# Patient Record
Sex: MALE | Race: WHITE | NOT HISPANIC OR LATINO | Employment: UNEMPLOYED | URBAN - METROPOLITAN AREA
[De-identification: names, ages, dates, MRNs, and addresses within clinical notes are randomized per-mention and may not be internally consistent; named-entity substitution may affect disease eponyms.]

---

## 2018-02-04 ENCOUNTER — OFFICE VISIT - RIVER FALLS (OUTPATIENT)
Dept: FAMILY MEDICINE | Facility: CLINIC | Age: 28
End: 2018-02-04

## 2018-02-04 ASSESSMENT — MIFFLIN-ST. JEOR: SCORE: 1590.4

## 2018-04-05 ENCOUNTER — OFFICE VISIT - RIVER FALLS (OUTPATIENT)
Dept: FAMILY MEDICINE | Facility: CLINIC | Age: 28
End: 2018-04-05

## 2018-04-05 ASSESSMENT — MIFFLIN-ST. JEOR: SCORE: 1583.14

## 2018-04-25 ENCOUNTER — OFFICE VISIT - RIVER FALLS (OUTPATIENT)
Dept: FAMILY MEDICINE | Facility: CLINIC | Age: 28
End: 2018-04-25

## 2018-04-25 ASSESSMENT — MIFFLIN-ST. JEOR: SCORE: 1616.71

## 2018-04-26 ENCOUNTER — OFFICE VISIT - RIVER FALLS (OUTPATIENT)
Dept: FAMILY MEDICINE | Facility: CLINIC | Age: 28
End: 2018-04-26

## 2018-04-26 ASSESSMENT — MIFFLIN-ST. JEOR: SCORE: 1608.54

## 2018-05-03 ENCOUNTER — OFFICE VISIT - RIVER FALLS (OUTPATIENT)
Dept: FAMILY MEDICINE | Facility: CLINIC | Age: 28
End: 2018-05-03

## 2018-05-03 ASSESSMENT — MIFFLIN-ST. JEOR: SCORE: 1595.84

## 2018-05-23 ENCOUNTER — OFFICE VISIT - RIVER FALLS (OUTPATIENT)
Dept: FAMILY MEDICINE | Facility: CLINIC | Age: 28
End: 2018-05-23

## 2018-05-23 ASSESSMENT — MIFFLIN-ST. JEOR: SCORE: 1581.33

## 2018-08-03 ENCOUNTER — OFFICE VISIT - RIVER FALLS (OUTPATIENT)
Dept: FAMILY MEDICINE | Facility: CLINIC | Age: 28
End: 2018-08-03

## 2018-08-03 ASSESSMENT — MIFFLIN-ST. JEOR: SCORE: 1633.04

## 2018-08-04 LAB
CREAT SERPL-MCNC: 0.79 MG/DL (ref 0.6–1.35)
GLUCOSE BLD-MCNC: 78 MG/DL (ref 65–99)

## 2022-02-11 VITALS
HEIGHT: 70 IN | DIASTOLIC BLOOD PRESSURE: 82 MMHG | HEIGHT: 70 IN | SYSTOLIC BLOOD PRESSURE: 122 MMHG | WEIGHT: 141.6 LBS | TEMPERATURE: 99 F | TEMPERATURE: 98.8 F | SYSTOLIC BLOOD PRESSURE: 124 MMHG | BODY MASS INDEX: 19.87 KG/M2 | BODY MASS INDEX: 20.53 KG/M2 | SYSTOLIC BLOOD PRESSURE: 126 MMHG | HEIGHT: 70 IN | HEART RATE: 84 BPM | TEMPERATURE: 98.2 F | WEIGHT: 135.6 LBS | DIASTOLIC BLOOD PRESSURE: 80 MMHG | SYSTOLIC BLOOD PRESSURE: 138 MMHG | TEMPERATURE: 99.2 F | SYSTOLIC BLOOD PRESSURE: 124 MMHG | HEART RATE: 108 BPM | WEIGHT: 136 LBS | WEIGHT: 138.8 LBS | DIASTOLIC BLOOD PRESSURE: 94 MMHG | WEIGHT: 143.4 LBS | BODY MASS INDEX: 19.47 KG/M2 | DIASTOLIC BLOOD PRESSURE: 92 MMHG | DIASTOLIC BLOOD PRESSURE: 94 MMHG | TEMPERATURE: 99.8 F | HEART RATE: 90 BPM | BODY MASS INDEX: 19.41 KG/M2 | HEART RATE: 90 BPM | BODY MASS INDEX: 20.27 KG/M2 | HEIGHT: 70 IN | HEIGHT: 70 IN

## 2022-02-11 VITALS
HEART RATE: 97 BPM | SYSTOLIC BLOOD PRESSURE: 135 MMHG | HEIGHT: 70 IN | BODY MASS INDEX: 21.05 KG/M2 | DIASTOLIC BLOOD PRESSURE: 89 MMHG | TEMPERATURE: 97.2 F | WEIGHT: 147 LBS

## 2022-02-11 VITALS
HEART RATE: 112 BPM | SYSTOLIC BLOOD PRESSURE: 120 MMHG | HEIGHT: 70 IN | TEMPERATURE: 99.1 F | WEIGHT: 137.6 LBS | BODY MASS INDEX: 19.7 KG/M2 | OXYGEN SATURATION: 99 % | DIASTOLIC BLOOD PRESSURE: 80 MMHG

## 2022-02-16 NOTE — PROGRESS NOTES
Patient:   MORE PAREDES            MRN: [id number]            FIN: [id number]               Age:   28 years     Sex:  Male     :  1990   Associated Diagnoses:   Syncope   Author:   Bhanu Gray MD      Visit Information      Date of Service: 2018 03:30 pm  Performing Location: [facility]  Encounter#: [id number]      Primary Care Provider (PCP):  YOGESH MARTINEZ      Referring Provider:  Bhanu Gray MD    NPI# [id number]      Chief Complaint   8/3/2018 3:33 PM CDT     Seizures and vomiting 2 days. Did have 2 seizures a few years ago. Has been sleeping more then normal. Did stop his Adderall a month ago.        History of Present Illness   Patient is concerned that he may have had a seizure.  He notes he got up 2 nights ago to go to the bathroom started feeling a little woozy lightheaded and and found himself on the floor.  No known seizure activity he was not incontinent he did not bite his tongue.  He had another episode yesterday morning right after taking a shower.  Both episodes are very brief.  The night before following supper he had several emesis but no nausea vomiting before or after.  He has been feeling fine since that time he did miss work.  Feels somewhat tired and fatigued yesterday and today but otherwise no fevers chills or sweats no headaches blurred vision double vision no shortness of breath.  No stool change.  Apparently he had drug-related seizures a few years ago.  Admits to doing heavy methamphetamines a month ago or so but none since.  He also said has stopped his Adderall.         Review of Systems   Constitutional:  Negative except as documented in history of present illness.    Eye:  Negative.    Ear/Nose/Mouth/Throat:  Negative.    Respiratory:  Negative.    Cardiovascular:  Negative.    Gastrointestinal:  Negative except as documented in history of present illness.    Genitourinary:  Negative.    Musculoskeletal:  Negative.     Integumentary:  Negative.    Neurologic:  Negative except as documented in history of present illness.       Health Status   Allergies:    Allergic Reactions (Selected)  Severity Not Documented  Codeine (Troubles breathing)  Penicillins (Breathing difficulties)   Medications:    Medications          No Recorded Medications        Histories   Past Medical History:    No active or resolved past medical history items have been selected or recorded.   Family History:    No family history items have been selected or recorded.   Procedure history:    No active procedure history items have been selected or recorded.   Social History:             No active social history items have been recorded.      Physical Examination   Vital Signs   8/3/2018 3:33 PM CDT Temperature Tympanic 97.2 DegF  LOW    Peripheral Pulse Rate 97 bpm    Systolic Blood Pressure 135 mmHg  HI    Diastolic Blood Pressure 89 mmHg  HI    Mean Arterial Pressure 104 mmHg      Measurements from flowsheet : Measurements   8/3/2018 3:33 PM CDT Height Measured - Standard 70 in    Weight Measured - Standard 147 lb    BSA 1.81 m2    Body Mass Index 21.09 kg/m2      General:  Alert and oriented, No acute distress.    Eye:  Pupils are equal, round and reactive to light, Extraocular movements are intact.    HENT:  No pharyngeal erythema.    Neck:  Supple, Non-tender, No lymphadenopathy, No thyromegaly.    Respiratory:  Lungs are clear to auscultation, Respirations are non-labored.    Cardiovascular:  Normal rate, Regular rhythm, No murmur, Normal peripheral perfusion, No edema.    Gastrointestinal:  Soft, Non-tender.    Neurologic:  Alert, Oriented, Normal sensory, Normal motor function, No focal deficits, Cranial Nerves II-XII are grossly intact.       Impression and Plan   Diagnosis     Syncope ([id number]).     Course:  Improving.    Plan:  Patient with episodes that sound be a syncopal episode rather than seizure.  We discussed with him his symptoms likely  related to recent emesis with dehydration.  Will check labs today.  He denied any help when it comes to drug addiction.  Follow-up for his routine cares continue to follow blood pressure  .    Patient Instructions:       Counseled: Patient, Regarding diagnosis, Regarding treatment, Activity.

## 2022-02-16 NOTE — PROGRESS NOTES
Chief Complaint    F/u constipation  History of Present Illness      Took the bottle of magnesium citrate without benefit.        Lump and pressure is present but less today.       Stool is hard most of the time. Last bowel movement 4 days. Usually goes twice a week for years.  Review of Systems      No fevers       No vomiting       Works at [employer]  Physical Exam   Vitals & Measurements    T: 99.0(Tympanic)  HR: 84(Peripheral)  BP: 124/92     HT: 70 in  WT: 141.6 lb  BMI: 20.32       General: No acute distress      Abdomen: Soft, nontender and nondistended      Musculoskeletal: Normal gait.  Sitting normally and comfortably today  Assessment/Plan   Constipation: He is concerned and would like to have a bowel movement.  Given colonoscopy prep information with Dulcolax and MiraLAX.  Will do that tonight and then take MiraLAX twice daily and follow-up in a week.  Patient Information     Name:MORE PAREDES      Address:      [address] [place] [id number]     Sex:Male     YOB: 1990     [phone]     Emergency Contact:[facility] EMERGENCY, CONTACT     MRN:[id number]     FIN:[id number]     Location:[facility]     Date of Service:04/26/2018      Primary Care Physician:       NONE ,       Attending Physician:       Paul Sargent MD, [phone]  Problem List/Past Medical History    Ongoing     No qualifying data    Historical     No qualifying data  Medications        Adderall XR 25 mg oral capsule, extended release: 25 mg, 1 cap(s), po, qam, takes as needed, 0 Refill(s).                Allergies    codeine (troubles breathing)    penicillins (breathing difficulties)

## 2022-02-16 NOTE — PROGRESS NOTES
Chief Complaint    F/u constipation  History of Present Illness      Over the past couple years usually has bowel movements about twice a week.  Stool has been hard most of the time.  Presented with sensation of a lump in his perianal region week ago and given a bottle of magnesium citrate without producing a stool but the lump and pressure feeling decreased. Given the colonoscopy miralax prep with great results. The pressure is gone and able to sit. Then only had one bowel movement yesterday.   Review of Systems      No fevers       No vomiting       Stopped smoking 4 days ago. Smoking since age 15  Physical Exam   Vitals & Measurements    T: 99.2   F (Tympanic)  HR: 90(Peripheral)  BP: 124/94     HT: 70 in  WT: 138.8 lb  BMI: 19.91       General: No acute distress      Muscle skeletal: Normal gait  Assessment/Plan   Constipation: Discussed high-fiber diet.  Encouraged him to use MiraLAX twice daily for the next month until he can improve his diet.  Follow-up if not improving.    Smoking: discussed  Patient Information     Name:MORE PAREDES      Address:      [address] [place] [id number]     Sex:Male     YOB: 1990     [phone]     Emergency Contact:[facility] EMERGENCY, CONTACT     MRN:[id number]     FIN:[id number]     Location:[facility]     Date of Service:05/03/2018      Primary Care Physician:       NONE ,       Attending Physician:       Paul Sargent MD, [phone]  Problem List/Past Medical History    Ongoing     No qualifying data    Historical     No qualifying data  Medications        Adderall XR 25 mg oral capsule, extended release: 25 mg, 1 cap(s), po, qam, takes as needed, 0 Refill(s).                Allergies    codeine (troubles breathing)    penicillins (breathing difficulties)  Social History    Smoking Status - 05/03/2018     Current every day smoker  Lab Results          Lab Results (Last 4 results within 90 days)            Chlam/N. gonorrhea Comments: See comment (04/26/18 15:45:00)          Chlamydia RNA: NOT DETECTED (04/26/18 15:45:00)          N. gonorrhea RNA: NOT DETECTED (04/26/18 15:45:00)

## 2022-02-16 NOTE — PROGRESS NOTES
Patient:   MORE PAREDES            MRN: [id number]            FIN: [id number]               Age:   28 years     Sex:  Male     :  1990   Associated Diagnoses:   Constipation; High blood pressure   Author:   Paul Sargent MD      Visit Information      Date of Service: 2018 01:40 pm  Performing Location: [facility]  Encounter#: [id number]      Primary Care Provider (PCP):  YOGESH MARTINEZ      Referring Provider:  Paul Sargent MD    NPI# [id number]      Chief Complaint   2018 1:45 PM CDT    F/u constipation        History of Present Illness   Having a bowel movement once every two days which is more frequent and less hard. Trying to improve his diet and increase the fiber.    Took adderall 4pm (when going to work) and had been outside in heat all day (felt muggy and no air conditioning at work) and then felt dizzy and disoriented for a few hours. Had a similar episode last summer one time during a hot day after taking adderall and another episode in the past.    Has had a seizure twice in his life. Had seizure age 22 and age 26 (likely from drugs). Possibly one of them from stopping prozac cold turkey    Adderall prescribed at [facility]. Has been on adderall in high school until age 20. Restarted it age 25. Took ritalin in elementary school.      Review of Systems   Respiratory:  No shortness of breath.    Cardiovascular:  No chest pain.    No headaches      Health Status   Allergies:    Allergic Reactions (Selected)  Severity Not Documented  Codeine (Troubles breathing)  Penicillins (Breathing difficulties)   Medications:  (Selected)   Documented Medications  Documented  Adderall XR 25 mg oral capsule, extended release: 1 cap(s) ( 25 mg ), po, qam, Instructions: takes as needed, 0 Refill(s), Type: Maintenance   Problem list:    No problem items selected or recorded.      Histories   Family History: Dad HTN. Mom Diabetes Type II   Social History: Works at [employer]      Physical Examination   Vital Signs   5/23/2018 2:15 PM CDT Peripheral Pulse Rate 90 bpm   5/23/2018 2:14 PM CDT Systolic Blood Pressure 138 mmHg  HI    Diastolic Blood Pressure 94 mmHg  HI   5/23/2018 1:45 PM CDT Peripheral Pulse Rate 124 bpm  HI    Systolic Blood Pressure 150 mmHg  HI    Diastolic Blood Pressure 100 mmHg  HI      Measurements from flowsheet : Measurements   5/23/2018 1:45 PM CDT Height Measured - Standard 70 in    Weight Measured - Standard 135.6 lb    BSA 1.74 m2    Body Mass Index 19.45 kg/m2      General:  Alert and oriented, No acute distress.    Neck:  No lymphadenopathy.    Respiratory:  Lungs are clear to auscultation.    Cardiovascular:  Normal rate.    Gastrointestinal:  Soft, Non-tender, Non-distended.    Musculoskeletal:  Normal gait.    Psychiatric:  Appropriate mood & affect.       Impression and Plan   Diagnosis     Constipation (XNC41-YB K59.00).     High blood pressure (FXO48-SJ I10).       Constipation: continue miralax once daily for the next 1-2 months while continuing to improve diet.  Blood pressure: continue to monitor. Could be affected by Adderall

## 2022-02-16 NOTE — PROGRESS NOTES
Chief Complaint    pt here for sinus pain and pressure, congestion, runny nose, chills, and swats  History of Present Illness      pt presents with 3 day hx of rhinorrhea, congestion, facial pressure.  no cough no nausea, vomiting fevers or chills.  no rash.  has no hx of sinus surgeries.  no exposures.  has not tried any OTC medications for this.  Review of Systems      Review of systems is negative with the exception of those noted in HPI          Physical Exam   Vitals & Measurements    T: 99.1(Tympanic)  HR: 112(Peripheral)  BP: 120/80  SpO2: 99%     HT: 70 in  WT: 137.6 lb  BMI: 19.74           Vitals as above per nursing documentation           Constituational : nad appears well          Ears: ears patent B, TMS intact, noninjected           Nose: nasal mucosa is ededmatous. with thin clear discharge B, tenderness to palpation of the maxillary sinuses           Throat: pharynx is nonerythematous, no tonsillar hypertrophy, no exudate           Neck: neck supple, no adenopathy, no thyromegaly, no rigidity           Lungs: lungs CTA', no Wheezes, rhonchi or rales           Heart: heart RRR, nl S1, S2 no murmur           skin:  No rashes              Assessment/Plan       Viral URI         discussed conservative measures of fluids, rest and ibuprofen or tylenol for comfort.  Pt instructed to return to clinic for persistent or worsening symptoms.                    Ordered:          32736 office outpatient visit 15 minutes (Charge), Quantity: 1, Viral URI                Orders:         [id number] office outpatient visit 15 minutes (Charge), Quantity: 1, Acute frontal sinusitis  Patient Information     Name:MORE PAREDES      Address:      [address] [place] [id number]     Sex:Male     YOB: 1990     Phone:[phone]     MRN:[id number]     FIN:[id number]     Location[facility]     Date of Service:02/04/2018      Primary Care Physician:       NONE ,   Problem List/Past  Medical History    Ongoing     No qualifying data    Historical  Medications     Adderall XR 25 mg oral capsule, extended release: 25 mg, 1 cap(s), po, qam, takes as needed, 0 Refill(s).     ZITHROMAX 500 MG ORAL TABLET ([id number]): 500 mg, po, 5 tab(s).      Allergies    codeine (troubles breathing)    penicillins (breathing difficulties)  Social History    Smoking Status - 02/04/2018     Former smoker  Lab Results   Results (Last 90 days)   No results located.

## 2022-02-16 NOTE — PROGRESS NOTES
Chief Complaint    c/o lump between testicles and rectum noticed it today, denies any blood in stools  History of Present Illness      Noticed a lump in perineal area today. Tried sitting on toilet and unable to have a bowel movement. Unable to sit down. Hurts with sitting. Has been more constipated lately. Denies blood. No pain with bowel movement. Sometimes stool is hard almost half the time.  Review of Systems      Has lactose intolerance.       No fevers       No vomiting.       Working at [employer]       Reports using LSD 2 days ago and marijuana.       He is sexually active with females.  No recent STD testing  Physical Exam   Vitals & Measurements    T: 98.2(Tympanic)  BP: 126/82     HT: 70 in  WT: 143.4 lb  BMI: 20.57       General: No acute distress      Abdomen: Soft, nontender nondistended      Musculoskeletal: Normal gait      Genitourinary: Testicles normal.  No inguinal tenderness or bulging.        Skin: Perineum is normal.  No external hemorrhoids.  No evidence of erythema, cellulitis or abscess.  No lump is felt       Rectal: Had discomfort with starting the exam so only minimally did it.  No masses or lesions felt in the anus.  Very nervous about the genitourinary exam.   Assessment/Plan   Perineal pain: No obvious abnormality at this time.  Will monitor.  Doubt anal fissure or hemorrhoids.  Doubt infection   Constipation: We will take a bottle of magnesium citrate tonight and then MiraLAX 3 times daily until loose.  Follow-up in 2-3 weeks.  Return sooner if worse    STD screening: We will check urine for chlamydia and gonorrhea  Patient Information     Name:MORE APREDES      Address:      [address] [place] [id number]     Sex:Male     YOB: 1990     [phone]     Emergency Contact:[facility], CONTACT     MRN:[id number]     FIN:[id number]     Location:[facility]     Date of Service:04/25/2018      Primary Care  Physician:       NONE ,       Attending Physician:       Paul Sargent MD, [phone]  Problem List/Past Medical History    Ongoing     No qualifying data    Historical     No qualifying data  Medications        Adderall XR 25 mg oral capsule, extended release: 25 mg, 1 cap(s), po, qam, takes as needed, 0 Refill(s).                Allergies    codeine (troubles breathing)    penicillins (breathing difficulties)

## 2022-02-16 NOTE — PROGRESS NOTES
"Chief Complaint    Pt here for  note. Missed work 4/2 and 4/4. Missed due to allergies.  History of Present Illness      Chief complaint as above reviewed and confirmed with patient.  Pt presents to the clinic with concerns re: uri sx, allergies and L ear pain.  he states he has had 1 week of rhinorrhea, congestion. some facial pressure.  Mild HA earlier in the week.  no nausea, vomiting. no fevers. no vomiting or diarrhea. no rash.  no chest pain.  He has known allergies this time of year, has been sneezing a lot. ear was more painful yesterday, better today.  tends to have \"ear problems\" when his allergies are acting up.   Review of Systems      Review of systems is negative with the exception of those noted in HPI          Physical Exam   Vitals & Measurements    T: 98.8(Tympanic)  HR: 108(Peripheral)  BP: 122/80     HT: 70 in  WT: 136 lb  BMI: 19.51           Vitals as above per nursing documentation           Constitutional : nad appears well          Ears: ears patent B, TMS intact, noninjected           Nose: nasal mucosa is edematous. no discharge           Throat: pharynx is nonerythematous, no tonsillar hypertrophy, no exudate           Neck: neck supple, no adenopathy, no thyromegaly, no rigidity           Lungs: lungs CTA', no Wheezes, rhonchi or rales           Heart: heart RRR, nl S1, S2 no murmur           skin:  No rashes              Assessment/Plan       Allergic rhinosinusitis         offered allergy medication, zyrtec or allegra.  He defers.  wants to avoid oral medications.  HE has a nasal spray and is fairly happy with that.  not sure what medication it is.  discussed to avoid using afrin greater than 3 days. otherwise nasal saline or flonase would be appropriate OTC.  Patient Information     Name:MORE PAREDES      Address:      [address] [place] [id number]     Sex:Male     YOB: 1990     [phone]     Emergency Contact:[facility], " CONTACT     MRN:[id number]     FIN:[id number]     Location:[facility]     Date of Service:04/05/2018      Primary Care Physician:       NONE ,   Problem List/Past Medical History    Ongoing     No qualifying data    Historical  Medications     Adderall XR 25 mg oral capsule, extended release: 25 mg, 1 cap(s), po, qam, takes as needed, 0 Refill(s).          Allergies    codeine (troubles breathing)    penicillins (breathing difficulties)  Social History    Smoking Status - 04/05/2018     Former smoker  Lab Results   Results (Last 90 days)   No results located.

## 2023-05-11 ENCOUNTER — HOSPITAL ENCOUNTER (EMERGENCY)
Facility: CLINIC | Age: 33
Discharge: HOME | End: 2023-05-11
Attending: EMERGENCY MEDICINE | Admitting: EMERGENCY MEDICINE
Payer: COMMERCIAL

## 2023-05-11 VITALS
SYSTOLIC BLOOD PRESSURE: 138 MMHG | HEART RATE: 109 BPM | RESPIRATION RATE: 12 BRPM | TEMPERATURE: 99.2 F | DIASTOLIC BLOOD PRESSURE: 102 MMHG | OXYGEN SATURATION: 96 %

## 2023-05-11 DIAGNOSIS — F11.10 OPIOID ABUSE (H): ICD-10-CM

## 2023-05-11 LAB
ALBUMIN SERPL BCG-MCNC: 4.2 G/DL (ref 3.5–5.2)
ALP SERPL-CCNC: 65 U/L (ref 40–129)
ALT SERPL W P-5'-P-CCNC: 12 U/L (ref 10–50)
ANION GAP SERPL CALCULATED.3IONS-SCNC: 10 MMOL/L (ref 7–15)
AST SERPL W P-5'-P-CCNC: 27 U/L (ref 10–50)
BASOPHILS # BLD AUTO: 0 10E3/UL (ref 0–0.2)
BASOPHILS NFR BLD AUTO: 0 %
BILIRUB SERPL-MCNC: 0.2 MG/DL
BUN SERPL-MCNC: 15.5 MG/DL (ref 6–20)
CALCIUM SERPL-MCNC: 8.5 MG/DL (ref 8.6–10)
CHLORIDE SERPL-SCNC: 106 MMOL/L (ref 98–107)
CREAT SERPL-MCNC: 0.83 MG/DL (ref 0.67–1.17)
DEPRECATED HCO3 PLAS-SCNC: 23 MMOL/L (ref 22–29)
EOSINOPHIL # BLD AUTO: 0.4 10E3/UL (ref 0–0.7)
EOSINOPHIL NFR BLD AUTO: 5 %
ERYTHROCYTE [DISTWIDTH] IN BLOOD BY AUTOMATED COUNT: 12.1 % (ref 10–15)
GFR SERPL CREATININE-BSD FRML MDRD: >90 ML/MIN/1.73M2
GLUCOSE SERPL-MCNC: 120 MG/DL (ref 70–99)
HCT VFR BLD AUTO: 42.7 % (ref 40–53)
HGB BLD-MCNC: 14.5 G/DL (ref 13.3–17.7)
IMM GRANULOCYTES # BLD: 0 10E3/UL
IMM GRANULOCYTES NFR BLD: 1 %
LYMPHOCYTES # BLD AUTO: 3.3 10E3/UL (ref 0.8–5.3)
LYMPHOCYTES NFR BLD AUTO: 50 %
MCH RBC QN AUTO: 29.3 PG (ref 26.5–33)
MCHC RBC AUTO-ENTMCNC: 34 G/DL (ref 31.5–36.5)
MCV RBC AUTO: 86 FL (ref 78–100)
MONOCYTES # BLD AUTO: 0.6 10E3/UL (ref 0–1.3)
MONOCYTES NFR BLD AUTO: 8 %
NEUTROPHILS # BLD AUTO: 2.4 10E3/UL (ref 1.6–8.3)
NEUTROPHILS NFR BLD AUTO: 36 %
NRBC # BLD AUTO: 0 10E3/UL
NRBC BLD AUTO-RTO: 0 /100
PLATELET # BLD AUTO: 291 10E3/UL (ref 150–450)
POTASSIUM SERPL-SCNC: 4.6 MMOL/L (ref 3.4–5.3)
PROT SERPL-MCNC: 6.9 G/DL (ref 6.4–8.3)
RBC # BLD AUTO: 4.95 10E6/UL (ref 4.4–5.9)
SODIUM SERPL-SCNC: 139 MMOL/L (ref 136–145)
WBC # BLD AUTO: 6.6 10E3/UL (ref 4–11)

## 2023-05-11 PROCEDURE — 85025 COMPLETE CBC W/AUTO DIFF WBC: CPT | Performed by: EMERGENCY MEDICINE

## 2023-05-11 PROCEDURE — 99285 EMERGENCY DEPT VISIT HI MDM: CPT | Mod: 25 | Performed by: EMERGENCY MEDICINE

## 2023-05-11 PROCEDURE — 36415 COLL VENOUS BLD VENIPUNCTURE: CPT | Performed by: EMERGENCY MEDICINE

## 2023-05-11 PROCEDURE — 99284 EMERGENCY DEPT VISIT MOD MDM: CPT | Performed by: EMERGENCY MEDICINE

## 2023-05-11 PROCEDURE — 80053 COMPREHEN METABOLIC PANEL: CPT | Performed by: EMERGENCY MEDICINE

## 2023-05-11 ASSESSMENT — ACTIVITIES OF DAILY LIVING (ADL)
ADLS_ACUITY_SCORE: 35
ADLS_ACUITY_SCORE: 35

## 2023-05-11 NOTE — DISCHARGE INSTRUCTIONS
Please follow up with the Recovery clinic tomorrow for further care.   Please follow up tomorrow morning and do not use any more fentanyl.     [facility]  [address], [address]  [place] [address]  Phone: [phone]  Fax:  [phone]       Hours:       Monday, Wednesday, Friday     Scheduled visits: 9:00 am - 4:00 pm     Walk-in hours:  9:00 am - 3:00 pm        Tuesday, Thursday     Walk-in only hours: 1:30 pm - 4:00 pm

## 2023-05-11 NOTE — ED PROVIDER NOTES
[place] EMERGENCY DEPARTMENT ([facility])    5/11/23       ED PROVIDER NOTE    History     Chief Complaint   Patient presents with     Drug Overdose     The history is provided by the EMS personnel, the patient and medical records. The history is limited by the condition of the patient.     Rl Song is a 33 year old male with past medical history significant for opioid type dependence, methamphetamine abuse who presents to the ED via EMS for drug overdose. Patient estimates smoking an unspecified amount of fentanyl 2 hours prior to arrival in the ED.  According to EMS, bystanders saw the patient unresponsive and began CPR.  While performing CPR the patient reportedly became responsive and no narcan was administered by EMS.  Patient denies use of other illicit substances today.  He reports a history of meth use but reiterates he did not use meth today.  Patient states he does want to quit using fentanyl.  He denies a history of overdoses.  He denies a history of suicidal or homicidal ideation.    Past Medical History  No past medical history on file.  No past surgical history on file.  No current outpatient medications on file.    Not on File  Family History  No family history on file.  Social History          A medically appropriate review of systems was performed with pertinent positives and negatives noted in the HPI, and all other systems negative.    Physical Exam      Physical Exam  Constitutional:       General: He is not in acute distress.     Appearance: He is well-developed.      Comments: Answering questions.  Able to follow commands.  Able to tell me what happened.  Patient sleepy but alert and oriented x3.   HENT:      Head: Normocephalic and atraumatic.   Eyes:      Comments: 2 mm equal and reactive.   Cardiovascular:      Rate and Rhythm: Normal rate and regular rhythm.      Heart sounds: Normal heart sounds.   Pulmonary:      Effort: Pulmonary effort is normal. No respiratory  distress.      Breath sounds: No wheezing.   Abdominal:      General: There is no distension.      Palpations: Abdomen is soft.      Tenderness: There is no abdominal tenderness. There is no rebound.   Musculoskeletal:      Cervical back: Normal range of motion and neck supple.   Skin:     General: Skin is warm.   Neurological:      General: No focal deficit present.      Mental Status: He is alert and oriented to person, place, and time.   Psychiatric:         Mood and Affect: Mood normal.         Behavior: Behavior normal.         Thought Content: Thought content normal.           ED Course, Procedures, & Data     12:26 PM  The patient was seen and examined by Dr. Tali Barnes in Room ED 08.     Procedures                      No results found for any visits on 05/11/23.  Medications - No data to display  Labs Ordered and Resulted from Time of ED Arrival to Time of ED Departure - No data to display  No orders to display          Critical care was not performed.     Medical Decision Making  The patient's presentation was of high complexity (an acute health issue posing potential threat to life or bodily function).    The patient's evaluation involved:  review of external note(s) from 2 sources (prior notes)  discussion of management or test interpretation with another health professional (discussed [facility] plan of suboxone treatment; discused possibly giving suboxone but pt does not meet criteria. )    The patient's management necessitated moderate risk (limitations due to social determinants of health (see separate area of note for details)).      Assessment & Plan    Patient is a 33-year-old male who was brought to the ER after using fentanyl.  Patient smoked with fentanyl approximately 2 hours ago.  Patient says that he wants to go through detox and wants to stop using.  Patient did not receive any Narcan prior to arrival.  Patient does not meet criteria for needing Narcan in precipitated withdrawal currently.   Patient is maintaining his airway and is stable.  Plan will be to discharge him once he is more awake and alert with follow-up in the [facility] clinic at [facility].    Patient was watched in the ER for approximately 3 hours.  Patient wanted to be discharged.  Patient did not want any resources.  Patient was alert and oriented.  No SI or HI.  Patient decision-making capacity.    I have reviewed the nursing notes. I have reviewed the findings, diagnosis, plan and need for follow up with the patient.    New Prescriptions    No medications on file       Final diagnoses:   Opioid abuse (H)     ITree, am serving as a trained medical scribe to document services personally performed by Tali Barnes MD, based on the provider's statements to me.      Tali LANDERS MD, was physically present and have reviewed and verified the accuracy of this note documented by [facility] [place].     [facility] EMERGENCY DEPARTMENT  5/11/2023     Tali Barnes MD  05/11/23 1925

## 2023-05-11 NOTE — ED NOTES
Bed: [facility]  Expected date:   Expected time:   Means of arrival:   Comments:  SPF  33 M  Smoked opiods - concern for overdose but awake and alert now  ETA 1200

## 2023-05-11 NOTE — PROGRESS NOTES
Pt asked nurse to call a number but was hesitant to tell the nurse what the number was for. Nurse gathered that it was a treatment center that pt had been staying at. Nurse called the number [phone] and met an answering machine but left message to call ED back for information.

## 2023-05-11 NOTE — ED TRIAGE NOTES
Was in a car with a friend smoking @ 11am  Friend called 911 for possible O/D  When STP fire arrived, compressions were being given  Unclear if pt was ever pulseless  Unclear on drug, but presenting like an opiate  Per pt, it was fentantyl  92% RA  No narcan given

## 2024-04-01 ENCOUNTER — HOSPITAL ENCOUNTER (EMERGENCY)
Facility: CLINIC | Age: 34
Discharge: HOME | End: 2024-04-01
Attending: EMERGENCY MEDICINE | Admitting: EMERGENCY MEDICINE
Payer: COMMERCIAL

## 2024-04-01 ENCOUNTER — TELEPHONE (OUTPATIENT)
Dept: BEHAVIORAL HEALTH | Facility: CLINIC | Age: 34
End: 2024-04-01
Payer: COMMERCIAL

## 2024-04-01 VITALS
RESPIRATION RATE: 16 BRPM | OXYGEN SATURATION: 100 % | DIASTOLIC BLOOD PRESSURE: 98 MMHG | HEART RATE: 89 BPM | TEMPERATURE: 98 F | SYSTOLIC BLOOD PRESSURE: 152 MMHG

## 2024-04-01 DIAGNOSIS — F11.23 OPIOID DEPENDENCE WITH WITHDRAWAL (H): ICD-10-CM

## 2024-04-01 PROCEDURE — G2213 INITIAT MED ASSIST TX IN ER: HCPCS | Performed by: EMERGENCY MEDICINE

## 2024-04-01 PROCEDURE — 99284 EMERGENCY DEPT VISIT MOD MDM: CPT | Mod: FS | Performed by: EMERGENCY MEDICINE

## 2024-04-01 PROCEDURE — 250N000013 HC RX MED GY IP 250 OP 250 PS 637: Performed by: PHYSICIAN ASSISTANT

## 2024-04-01 PROCEDURE — 99283 EMERGENCY DEPT VISIT LOW MDM: CPT | Performed by: EMERGENCY MEDICINE

## 2024-04-01 RX ORDER — BUPRENORPHINE AND NALOXONE 8; 2 MG/1; MG/1
1 FILM, SOLUBLE BUCCAL; SUBLINGUAL DAILY
Qty: 30 FILM | Refills: 0 | Status: SHIPPED | OUTPATIENT
Start: 2024-04-01

## 2024-04-01 RX ORDER — BUPRENORPHINE HYDROCHLORIDE AND NALOXONE HYDROCHLORIDE DIHYDRATE 8; 2 MG/1; MG/1
1 TABLET SUBLINGUAL ONCE
Status: DISCONTINUED | OUTPATIENT
Start: 2024-04-01 | End: 2024-04-01

## 2024-04-01 RX ORDER — BUPRENORPHINE HYDROCHLORIDE AND NALOXONE HYDROCHLORIDE DIHYDRATE 8; 2 MG/1; MG/1
1 TABLET SUBLINGUAL 2 TIMES DAILY
Status: DISCONTINUED | OUTPATIENT
Start: 2024-04-01 | End: 2024-04-01

## 2024-04-01 RX ORDER — BUPRENORPHINE AND NALOXONE 8; 2 MG/1; MG/1
1 FILM, SOLUBLE BUCCAL; SUBLINGUAL ONCE
Status: COMPLETED | OUTPATIENT
Start: 2024-04-01 | End: 2024-04-01

## 2024-04-01 RX ADMIN — BUPRENORPHINE AND NALOXONE 1 FILM: 8; 2 FILM, SOLUBLE BUCCAL; SUBLINGUAL at 12:57

## 2024-04-01 ASSESSMENT — ACTIVITIES OF DAILY LIVING (ADL): ADLS_ACUITY_SCORE: 33

## 2024-04-01 ASSESSMENT — LIFESTYLE VARIABLES: TOTAL_SCORE: 10

## 2024-04-01 ASSESSMENT — COLUMBIA-SUICIDE SEVERITY RATING SCALE - C-SSRS
6. HAVE YOU EVER DONE ANYTHING, STARTED TO DO ANYTHING, OR PREPARED TO DO ANYTHING TO END YOUR LIFE?: NO
1. IN THE PAST MONTH, HAVE YOU WISHED YOU WERE DEAD OR WISHED YOU COULD GO TO SLEEP AND NOT WAKE UP?: NO
2. HAVE YOU ACTUALLY HAD ANY THOUGHTS OF KILLING YOURSELF IN THE PAST MONTH?: NO

## 2024-04-01 NOTE — ED TRIAGE NOTES
Patient has not had suboxone since Wednesday, has not used.  Having withdrawal symptoms.    Previously on 8mg once a day

## 2024-04-01 NOTE — ED PROVIDER NOTES
ED Provider Note  [facility]      History     Chief Complaint   Patient presents with    Medication Refill     HPI  Rl Song is a 33 year old male past medical history significant for opiate abuse, methamphetamine abuse, who presents emergency department tonight with concerns for opioid withdrawal.    Patient presents alone.  He states about 2 weeks ago he got out of intermediate, was given Suboxone while in intermediate 16 mg daily.  He notes that he has not since been prescribed Suboxone, last dose was Wednesday, 5 days ago he states he took a portion of a 12-3 Suboxone strip and has not had any Suboxone or other opioids since that time.  He states he is clean from fentanyl and heroin and denies any recent meth use.  He does not drink alcohol.  He denies any safety concerns denies any SI or HI.  He states he was seen at an outside clinic on Friday, for opioid use, however was not prescribed Suboxone and has not heard back from his clinic.  He is hoping for Suboxone refill today.  No other concerns.  He notes associated diarrhea body aches and anxiety, diaphoresis since last use of Suboxone.    Past Medical History  No past medical history on file.  No past surgical history on file.  buprenorphine HCl-naloxone HCl (SUBOXONE) 8-2 MG per film      Allergies   Allergen Reactions    Banana GI Disturbance and Anaphylaxis     Other reaction(s): Throat swelling      Codeine Difficulty breathing, Palpitations, Other (See Comments) and Shortness Of Breath    Penicillins Anaphylaxis and Shortness Of Breath     Family History  No family history on file.  Social History          A medically appropriate review of systems was performed with pertinent positives and negatives noted in the HPI, and all other systems negative.    Physical Exam   BP: (!) 152/98  Pulse: 89  Temp: 98  F (36.7  C)  Resp: 16  SpO2: 100 %  Physical Exam      GENERAL APPEARANCE: The patient is well developed, well appearing, and in no  acute distress.  HEAD:  Normocephalic and atraumatic.   EENT: Voice normal.  NECK: Trachea is midline.  LUNGS: Breath sounds are equal and clear bilaterally. No wheezes, rhonchi, or rales.  HEART: Regular rate and normal rhythm.    ABDOMEN: Soft, flat, and benign. No mass, tenderness, guarding, or rebound.Bowel sounds are present.  EXTREMITIES: No cyanosis, clubbing, or edema.  NEUROLOGIC: No focal sensory or motor deficits are noted.  Mild tremor noted bilaterally in the upper extremities.  PSYCHIATRIC: The patient is awake, alert.  Appropriate mood and affect.  SKIN: Warm, dry, and well perfused. Good turgor.  Patient is diaphoretic.    ED Course, Procedures, & Data         -----  Initiation of Medication for the Treatment of Opioid Use Disorder (OUD) in the Emergency Department (ED)  [facility]      Assessment:   Opioid(s) used: fentanyl and heroin and Suboxone  Amount: 8 mg Suboxone daily  Frequency: Daily  Route: oral  Duration: Weeks  Last use: 3/27    Other substance(s) with ongoing use: None, patient states he is currently sober from fentanyl and heroin    The patient meets the following DSM-V criteria for Opioid Use Disorder (OUD):   Withdrawal    (Severity: Mild: 2-3 criteria, Moderate: 4-5 criteria. Severe: 6 or more criteria)  Based on my assessment, the patient has Moderate OUD.     Medication Initiated in the ED:  In the ED, treatment for OUD was initiated. The patient was given 1 dose(s) of  8 mg  buprenorphine while in the ED.     Upon ED discharge, outpatient prescription treatment for OUD was initiated.   The patient was prescribed Suboxone.      Referral to Ongoing Care and Supportive Services:   Upon ED discharge, the patient was referred to Addiction Medicine for ongoing care and supportive services. The patient was also provided with information on community resources for various supportive services for OUD, and advised to return to the ED if having worsening symptoms.   -----      No  results found for any visits on 04/01/24.  Medications   buprenorphine HCl-naloxone HCl (SUBOXONE) 8-2 MG per film 1 Film (1 Film Sublingual $Given 4/1/24 1257)     Labs Ordered and Resulted from Time of ED Arrival to Time of ED Departure - No data to display  No orders to display          Critical care was not performed.     Medical Decision Making      Assessment & Plan    This is a 33-year-old male with history of opioid abuse presenting with concerns for opiate withdrawal.  On presentation he is not tachycardic he is not febrile he is mildly hypertensive 152/98.  On exam he is diaphoretic, appears anxious.  He states his last use was 5 days ago and denies other drug use with his symptoms.  He is agreeable to Suboxone here.  He was given initial loading dose of 8 mg.  He will be discharged with [facility] prescription for Suboxone along with Suboxone instructions.  He was made aware of the ear electrode device through the recovery clinic and will be taken to the clinic by nursing staff after today's visit.  Patient has no other questions or concerns at this time, and they were in agreement with the patient care plan provided.    Patient seen and discussed with attending physician , who agrees with my plan of care.    I have reviewed the nursing notes. I have reviewed the findings, diagnosis, plan and need for follow up with the patient.    Discharge Medication List as of 4/1/2024 12:51 PM        START taking these medications    Details   buprenorphine HCl-naloxone HCl (SUBOXONE) 8-2 MG per film Place 1 Film under the tongue daily, Disp-30 Film, R-0, [facility]             Final diagnoses:   Opioid dependence with withdrawal (H)       Melodie Pandya [facility] EMERGENCY DEPARTMENT  4/1/2024    ===========    --    ED Attending Physician Attestation    I Benjamin Ca MD, cared for this patient with the Advanced Practice Provider (SHARI). I have performed a history and physical  examination of the patient independent of the SHARI. I reviewed the SHARI's documentation above and agree with the documented findings and plan of care. I personally provided a substantive portion of the care for this patient, including the complete Medical Decision Making. Please see the SHARI's documentation for full details.    Summary of HPI, PE, ED Course   Patient is a 33 year old male evaluated in the emergency department for opioid use disorder and withdrawal missing Suboxone.  History of heroin and fentanyl use    Exam and ED course notable for breathing comfortably, anxious appearing, pupils mildly dilated, atraumatic head, ambulatory.       Medical Decision Making  The patient's presentation was of high complexity (a chronic illness severe exacerbation, progression, or side effect of treatment).    The patient's evaluation involved:  ordering and/or review of 3+ test(s) in this encounter (see separate area of note for details)    The patient's management necessitated moderate risk (prescription drug management including medications given in the ED).    Assessment and plan:   Opioid use disorder in active withdrawal    - Suboxone induction in the emergency department, medication assisted therapy   -Referral to addiction medicine and recovery clinic   - Counseled regarding Damion bridge device   - Substance use navigator consult and prescribed Suboxone      Benjamin Ca MD  Emergency Medicine        Benjamin Ca MD  04/01/24 2264

## 2024-04-01 NOTE — ED NOTES
RN walked patient to [facility], got suboxone fill. Patient then walked to recovery clinic.

## 2024-04-01 NOTE — DISCHARGE INSTRUCTIONS
"Buprenorphine (Suboxone) Home Induction Instructions  (instructions adapted from [facility])      Plan to take a day off and have a place to rest.     Stop using and wait until you feel very sick from the withdrawals (at least 12 hours is best, if using fentanyl it may take a few days). You should have at least 3 of the following:  bad chills or sweating  heavy yawning  joint/bone aches  enlarged pupils  runny nose or watery eyes  feeling restless  goose bumps  anxious or irritable  cramps, nausea, vomiting or diarrhea  twitching/tremors/shakes    Dose one or two 8 mg tablets or strips under your tongue (total dose of 8-16 mg).     Make sure the tablet or film fully dissolves under your tongue (takes about 15 minutes). The medication will not work as well if swallowed into the stomach.     After an hour, repeat the dose (another 8-16 mg) to feel well.     The next day, take 16-32 mg (2-4 tablets or films) at one time.          --  If you have started buprenorphine before:  If it went well, that's great! Just do that again.   If it was difficult, talk with your care team to figure out what happened and find ways to make it better this time. You may need a different dosing plan than what is listed here.     If you have never started buprenorphine before:  Gather your support team and if possible take a day off.   You are going to want space to rest. Don't drive.   Using cocaine, methamphetamine, alcohol, or pills makes starting Suboxone/buprenorphine harder, and mixing in alcohol or benzodiazepines can be dangerous.     --  If you have a light habit (e.g. 5 \"Norco 10's\" per day):  Consider a low dose: start with 4 mg and stop and 8 mg total.   WARNING: Withdrawal will continue if you don't take enough buprenorphine.     If you have a heavy habit (e.g. injecting 2 g heroin per day or smoking 1 g fentanyl per day)  Consider a high dose: start with a first dose of 16 mg  For most people, the effects of " buprenorphine max out at around 24-32 mg.   WARNING: Too much buprenorphine can make you feel sick and sleepy.    --  Follow up with your addiction specialist as scheduled for further dosing recommendations and medication refills.     [place] [id number], [address]  [place] [address]  Phone: [phone]  Fax:  [phone]       Hours:       Monday, Wednesday, Friday     Scheduled visits: 9:00 am - 4:00 pm     Walk-in hours:  9:00 am - 3:00 pm        Tuesday, Thursday     Walk-in only hours: 1:30 pm - 4:00 pm

## 2024-04-01 NOTE — TELEPHONE ENCOUNTER
"Patient presented to the Recovery Clinic from the [facility] ED. Patient had Suboxone film in mouth. Patient reported being in withdrawal and presenting to the ED. Received Suboxone. Patient reports \"feeling much better.\" Patient has Suboxone 8-2mg, #30 films, box with him from ED.     Patient reported his Peer  brought him to the ED from outpatient treatment. Patient reports he is living in a sober house. [facility].    Patient scheduled for follow up at the Recovery Clinic next week. Patient would like to try Suboxone 8-2mg daily dose and check in next week. Reports he may still experience cravings. Provided patient with Recovery Clinic walk-in hours and encouraged him to return before scheduled appt 4/8/24 if having significant cravings to discuss dose increase. Patient verbalized understanding.    [facility]  [address] [place] [address], [address]   [place], [place], [address]  Phone: [phone]  Fax: [phone]    Open Monday-Friday  Closed over lunch hour  Walk in hours: 9am-11:30am and 12:30-3pm    Carrie Craven RN on 4/1/2024 at 2:04 PM              "

## 2024-11-21 ENCOUNTER — HOSPITAL ENCOUNTER (EMERGENCY)
Facility: CLINIC | Age: 34
Discharge: HOME | End: 2024-11-21
Attending: EMERGENCY MEDICINE | Admitting: EMERGENCY MEDICINE
Payer: COMMERCIAL

## 2024-11-21 VITALS
TEMPERATURE: 99 F | SYSTOLIC BLOOD PRESSURE: 129 MMHG | BODY MASS INDEX: 21.52 KG/M2 | HEART RATE: 94 BPM | OXYGEN SATURATION: 99 % | WEIGHT: 150 LBS | DIASTOLIC BLOOD PRESSURE: 96 MMHG | RESPIRATION RATE: 16 BRPM

## 2024-11-21 DIAGNOSIS — Z79.899 ENCOUNTER FOR MONITORING SUBOXONE MAINTENANCE THERAPY: ICD-10-CM

## 2024-11-21 DIAGNOSIS — Z51.81 ENCOUNTER FOR MONITORING SUBOXONE MAINTENANCE THERAPY: ICD-10-CM

## 2024-11-21 PROBLEM — F09 PSYCHOSIS, ORGANIC: Status: ACTIVE | Noted: 2019-06-12

## 2024-11-21 PROBLEM — F15.10 AMPHETAMINE ABUSE (H): Status: ACTIVE | Noted: 2019-06-12

## 2024-11-21 PROBLEM — F15.90 STIMULANT USE DISORDER: Status: ACTIVE | Noted: 2019-05-02

## 2024-11-21 PROCEDURE — G2213 INITIAT MED ASSIST TX IN ER: HCPCS | Performed by: EMERGENCY MEDICINE

## 2024-11-21 PROCEDURE — 250N000012 HC RX MED GY IP 250 OP 636 PS 637: Performed by: EMERGENCY MEDICINE

## 2024-11-21 PROCEDURE — 99283 EMERGENCY DEPT VISIT LOW MDM: CPT | Performed by: EMERGENCY MEDICINE

## 2024-11-21 RX ORDER — BUPRENORPHINE AND NALOXONE 4; 1 MG/1; MG/1
1 FILM, SOLUBLE BUCCAL; SUBLINGUAL ONCE
Status: COMPLETED | OUTPATIENT
Start: 2024-11-21 | End: 2024-11-21

## 2024-11-21 RX ORDER — BUPRENORPHINE HYDROCHLORIDE AND NALOXONE HYDROCHLORIDE DIHYDRATE 8; 2 MG/1; MG/1
1 TABLET SUBLINGUAL ONCE
Status: DISCONTINUED | OUTPATIENT
Start: 2024-11-21 | End: 2024-11-21

## 2024-11-21 RX ORDER — BUPRENORPHINE AND NALOXONE 4; 1 MG/1; MG/1
1 FILM, SOLUBLE BUCCAL; SUBLINGUAL 2 TIMES DAILY
Qty: 10 FILM | Refills: 0 | Status: SHIPPED | OUTPATIENT
Start: 2024-11-21 | End: 2024-11-26

## 2024-11-21 RX ORDER — BUPRENORPHINE HYDROCHLORIDE AND NALOXONE HYDROCHLORIDE DIHYDRATE 8; 2 MG/1; MG/1
1 TABLET SUBLINGUAL 2 TIMES DAILY
Status: DISCONTINUED | OUTPATIENT
Start: 2024-11-21 | End: 2024-11-21

## 2024-11-21 RX ADMIN — BUPRENORPHINE AND NALOXONE 1 FILM: 4; 1 FILM, SOLUBLE BUCCAL; SUBLINGUAL at 16:01

## 2024-11-21 ASSESSMENT — COLUMBIA-SUICIDE SEVERITY RATING SCALE - C-SSRS
2. HAVE YOU ACTUALLY HAD ANY THOUGHTS OF KILLING YOURSELF IN THE PAST MONTH?: NO
1. IN THE PAST MONTH, HAVE YOU WISHED YOU WERE DEAD OR WISHED YOU COULD GO TO SLEEP AND NOT WAKE UP?: NO
6. HAVE YOU EVER DONE ANYTHING, STARTED TO DO ANYTHING, OR PREPARED TO DO ANYTHING TO END YOUR LIFE?: NO

## 2024-11-21 ASSESSMENT — ACTIVITIES OF DAILY LIVING (ADL)
ADLS_ACUITY_SCORE: 0
ADLS_ACUITY_SCORE: 0

## 2024-11-21 NOTE — ED TRIAGE NOTES
Reports he hasn't been able to get his suboxone for 3 days. Has been sober for opiates for 1.5 years and has been on suboxone for 3 years.  Denies other drug use, SI, ETOH.     Pt repors 6-7/10 pain, denies HA, has a runny nose and hot/cold flashes.      Triage Assessment (Adult)       Row Name 11/21/24 1442          Triage Assessment    Airway WDL WDL        Respiratory WDL    Respiratory WDL WDL        Skin Circulation/Temperature WDL    Skin Circulation/Temperature WDL WDL        Cardiac WDL    Cardiac WDL X        Peripheral/Neurovascular WDL    Peripheral Neurovascular WDL WDL

## 2024-11-21 NOTE — DISCHARGE INSTRUCTIONS
You were seen in the emergency department for opioid withdrawal.  To help you have more control over your substance use, the emergency department team referred to the [facility] located in the [facility].      The services provided are: medication assisted treatment with suboxone, substance use disorder evaluation and peer support services.     You have been scheduled for an appointment at the [facility]- Addiction Medicine.  Please refer to your ED discharge instructions for the date and time.    Location:  [facility]      [address] [place] [address] S, Sixth Floor  [address]    [place] [address]    Phone: [phone]

## 2024-11-21 NOTE — ED PROVIDER NOTES
ED Provider Note  [facility]      History     Chief Complaint   Patient presents with    Medication Refill    Withdrawal     HPI  Rl Song is a 34 year old male with a history of amphetamine use disorder and opioid use disorder who presents to the ED for evaluation of opioid use disorder requesting a refill of Suboxone.  Patient has been taking 4 mg twice a day.  He feels like this has been an appropriate dose for him.  It has been 3 days since the last took any Suboxone.  He tried to contact his normal prescriber to get a refill but was unable to reach him.  He is not sure where this provider works but he usually goes to his outpatient clinic to get the prescription filled.  He has not done any illegal substances or unprescribed opiates.  Denies other drug use.  No mental health concerns or medical concerns today.    Past Medical History  History reviewed. No pertinent past medical history.  History reviewed. No pertinent surgical history.  buprenorphine HCl-naloxone HCl (SUBOXONE) 4-1 MG per film      Allergies   Allergen Reactions    Banana GI Disturbance and Anaphylaxis     Other reaction(s): Throat swelling      Codeine Difficulty breathing, Palpitations, Other (See Comments) and Shortness Of Breath    Penicillins Anaphylaxis and Shortness Of Breath     Family History  No family history on file.  Social History   Social History     Tobacco Use    Smoking status: Every Day     Types: Cigarettes    Smokeless tobacco: Never   Substance Use Topics    Alcohol use: Not Currently    Drug use: Not Currently      A medically appropriate review of systems was performed with pertinent positives and negatives noted in the HPI, and all other systems negative.    Physical Exam   BP: (!) 156/90  Pulse: 104  Temp: 98.4  F (36.9  C)  Resp: 18  Weight: 68 kg (150 lb)  SpO2: 100 %  Physical Exam  Vitals and nursing note reviewed.   Constitutional:       General: He is not in acute distress.      Appearance: He is well-developed. He is not ill-appearing or diaphoretic.   HENT:      Head: Normocephalic and atraumatic.      Nose: Nose normal.   Eyes:      General: No scleral icterus.     Conjunctiva/sclera: Conjunctivae normal.   Cardiovascular:      Rate and Rhythm: Normal rate.   Pulmonary:      Effort: Pulmonary effort is normal. No respiratory distress.      Breath sounds: No stridor.   Abdominal:      General: There is no distension.   Musculoskeletal:         General: No deformity. Normal range of motion.      Cervical back: Normal range of motion and neck supple.   Skin:     General: Skin is warm and dry.      Coloration: Skin is not jaundiced or pale.      Findings: No erythema or rash.   Neurological:      General: No focal deficit present.      Mental Status: He is alert and oriented to person, place, and time.   Psychiatric:         Attention and Perception: Attention normal.         Mood and Affect: Mood normal.         Speech: Speech normal.         Behavior: Behavior normal. Behavior is cooperative.         Thought Content: Thought content normal.         Judgment: Judgment normal.           ED Course, Procedures, & Data      Procedures                No results found for any visits on 11/21/24.  Medications   buprenorphine HCl-naloxone HCl (SUBOXONE) 4-1 MG per film 1 Film (has no administration in time range)     Labs Ordered and Resulted from Time of ED Arrival to Time of ED Departure - No data to display  No orders to display              Assessment & Plan    Rl Song is a 34 year old male with a history of amphetamine use disorder and opioid use disorder who presents to the ED for evaluation of opioid use disorder requesting a refill of Suboxone.     Ddx: Suboxone refill, opiate use disorder, opiate withdrawal, difficulty with access to care    Patient given a dose of 4-1 mg Suboxone in the emergency department.  He was referred to addiction medicine for follow-up to establish care  with a consistent provider.  He can cancel this appointment if he decides to continue care with the current prescriber although he has been having trouble accessing him.  I also informed the patient that they have walk-in hours during weekdays at the [facility] in this building.  He was given a formal referral and a 5-day prescription of Suboxone in case he cannot be seen before the weekend.      I have reviewed the nursing notes. I have reviewed the findings, diagnosis, plan and need for follow up with the patient.    New Prescriptions    BUPRENORPHINE HCL-NALOXONE HCL (SUBOXONE) 4-1 MG PER FILM    Place 1 Film under the tongue 2 times daily for 5 days.       Final diagnoses:   Encounter for monitoring Suboxone maintenance therapy         [facility] EMERGENCY DEPARTMENT  11/21/2024     Mary Webster MD  11/21/24 6649